# Patient Record
Sex: FEMALE | Race: WHITE | NOT HISPANIC OR LATINO | URBAN - METROPOLITAN AREA
[De-identification: names, ages, dates, MRNs, and addresses within clinical notes are randomized per-mention and may not be internally consistent; named-entity substitution may affect disease eponyms.]

---

## 2020-08-07 ENCOUNTER — APPOINTMENT (RX ONLY)
Dept: URBAN - METROPOLITAN AREA CLINIC 86 | Facility: CLINIC | Age: 50
Setting detail: DERMATOLOGY
End: 2020-08-07

## 2020-08-07 DIAGNOSIS — Z41.9 ENCOUNTER FOR PROCEDURE FOR PURPOSES OTHER THAN REMEDYING HEALTH STATE, UNSPECIFIED: ICD-10-CM

## 2020-08-07 PROCEDURE — ? COSMETIC CONSULTATION: FILLERS

## 2020-08-07 PROCEDURE — ? ADDITIONAL NOTES

## 2020-08-07 PROCEDURE — ? MEDICAL CONSULTATION: DYNAMIC RHYTIDES

## 2020-08-07 PROCEDURE — ? BOTOX (U OR CC)

## 2020-08-07 PROCEDURE — ? JUVEDERM ULTRA XC INJECTION

## 2020-08-07 ASSESSMENT — LOCATION SIMPLE DESCRIPTION DERM
LOCATION SIMPLE: GLABELLA
LOCATION SIMPLE: RIGHT TEMPLE
LOCATION SIMPLE: LEFT CHEEK
LOCATION SIMPLE: INFERIOR FOREHEAD
LOCATION SIMPLE: RIGHT CHEEK

## 2020-08-07 ASSESSMENT — LOCATION DETAILED DESCRIPTION DERM
LOCATION DETAILED: RIGHT INFERIOR MEDIAL MALAR CHEEK
LOCATION DETAILED: LEFT SUPERIOR LATERAL MALAR CHEEK
LOCATION DETAILED: RIGHT CENTRAL BUCCAL CHEEK
LOCATION DETAILED: LEFT CENTRAL BUCCAL CHEEK
LOCATION DETAILED: LEFT MEDIAL BUCCAL CHEEK
LOCATION DETAILED: RIGHT INFERIOR TEMPLE
LOCATION DETAILED: RIGHT MEDIAL BUCCAL CHEEK
LOCATION DETAILED: RIGHT SUPERIOR PREAURICULAR CHEEK
LOCATION DETAILED: GLABELLA
LOCATION DETAILED: LEFT INFERIOR MEDIAL MALAR CHEEK
LOCATION DETAILED: INFERIOR MID FOREHEAD

## 2020-08-07 ASSESSMENT — LOCATION ZONE DERM: LOCATION ZONE: FACE

## 2020-08-07 NOTE — PROCEDURE: BOTOX (U OR CC)
Price (Use Numbers Only, No Special Characters Or $): 303 Price (Use Numbers Only, No Special Characters Or $): 003

## 2020-08-07 NOTE — PROCEDURE: JUVEDERM ULTRA XC INJECTION
Price (Use Numbers Only, No Special Characters Or $): 052 Price (Use Numbers Only, No Special Characters Or $): 399

## 2020-08-07 NOTE — PROCEDURE: ADDITIONAL NOTES
Detail Level: Detailed
Additional Notes: 1-syringe J. Ultra ($600) or J. Vollure ($750)\\n\\n20 units glabella \\n10 units forehead\\n6/6 units crows
normal (ped)...
